# Patient Record
Sex: MALE | Race: OTHER | Employment: OTHER | URBAN - METROPOLITAN AREA
[De-identification: names, ages, dates, MRNs, and addresses within clinical notes are randomized per-mention and may not be internally consistent; named-entity substitution may affect disease eponyms.]

---

## 2019-11-06 ENCOUNTER — HOSPITAL ENCOUNTER (INPATIENT)
Facility: HOSPITAL | Age: 49
LOS: 3 days | Discharge: HOME | DRG: 699 | End: 2019-11-09
Attending: EMERGENCY MEDICINE | Admitting: INTERNAL MEDICINE
Payer: COMMERCIAL

## 2019-11-06 ENCOUNTER — APPOINTMENT (OUTPATIENT)
Dept: ULTRASOUND IMAGING | Facility: HOSPITAL | Age: 49
DRG: 699 | End: 2019-11-06
Attending: EMERGENCY MEDICINE
Payer: COMMERCIAL

## 2019-11-06 ENCOUNTER — APPOINTMENT (OUTPATIENT)
Dept: GENERAL RADIOLOGY | Facility: HOSPITAL | Age: 49
DRG: 699 | End: 2019-11-06
Attending: EMERGENCY MEDICINE
Payer: COMMERCIAL

## 2019-11-06 ENCOUNTER — APPOINTMENT (OUTPATIENT)
Dept: CV DIAGNOSTICS | Facility: HOSPITAL | Age: 49
DRG: 699 | End: 2019-11-06
Attending: INTERNAL MEDICINE
Payer: COMMERCIAL

## 2019-11-06 ENCOUNTER — APPOINTMENT (OUTPATIENT)
Dept: ULTRASOUND IMAGING | Facility: HOSPITAL | Age: 49
DRG: 699 | End: 2019-11-06
Attending: INTERNAL MEDICINE
Payer: COMMERCIAL

## 2019-11-06 DIAGNOSIS — R60.0 LOWER EXTREMITY EDEMA: ICD-10-CM

## 2019-11-06 DIAGNOSIS — R80.9 PROTEINURIA, UNSPECIFIED TYPE: ICD-10-CM

## 2019-11-06 DIAGNOSIS — R06.00 DYSPNEA ON EXERTION: Primary | ICD-10-CM

## 2019-11-06 DIAGNOSIS — J90 PLEURAL EFFUSION: ICD-10-CM

## 2019-11-06 PROBLEM — R73.9 HYPERGLYCEMIA: Status: ACTIVE | Noted: 2019-11-06

## 2019-11-06 PROCEDURE — 93306 TTE W/DOPPLER COMPLETE: CPT | Performed by: INTERNAL MEDICINE

## 2019-11-06 PROCEDURE — 86704 HEP B CORE ANTIBODY TOTAL: CPT | Performed by: INTERNAL MEDICINE

## 2019-11-06 PROCEDURE — 87340 HEPATITIS B SURFACE AG IA: CPT | Performed by: INTERNAL MEDICINE

## 2019-11-06 PROCEDURE — 83883 ASSAY NEPHELOMETRY NOT SPEC: CPT | Performed by: INTERNAL MEDICINE

## 2019-11-06 PROCEDURE — 80048 BASIC METABOLIC PNL TOTAL CA: CPT | Performed by: EMERGENCY MEDICINE

## 2019-11-06 PROCEDURE — 83516 IMMUNOASSAY NONANTIBODY: CPT | Performed by: INTERNAL MEDICINE

## 2019-11-06 PROCEDURE — 82570 ASSAY OF URINE CREATININE: CPT | Performed by: INTERNAL MEDICINE

## 2019-11-06 PROCEDURE — 93010 ELECTROCARDIOGRAM REPORT: CPT | Performed by: EMERGENCY MEDICINE

## 2019-11-06 PROCEDURE — 86038 ANTINUCLEAR ANTIBODIES: CPT | Performed by: INTERNAL MEDICINE

## 2019-11-06 PROCEDURE — 86255 FLUORESCENT ANTIBODY SCREEN: CPT | Performed by: INTERNAL MEDICINE

## 2019-11-06 PROCEDURE — 93005 ELECTROCARDIOGRAM TRACING: CPT

## 2019-11-06 PROCEDURE — 86160 COMPLEMENT ANTIGEN: CPT | Performed by: INTERNAL MEDICINE

## 2019-11-06 PROCEDURE — 84439 ASSAY OF FREE THYROXINE: CPT | Performed by: EMERGENCY MEDICINE

## 2019-11-06 PROCEDURE — 83876 ASSAY MYELOPEROXIDASE: CPT | Performed by: INTERNAL MEDICINE

## 2019-11-06 PROCEDURE — 76770 US EXAM ABDO BACK WALL COMP: CPT | Performed by: INTERNAL MEDICINE

## 2019-11-06 PROCEDURE — 84300 ASSAY OF URINE SODIUM: CPT | Performed by: INTERNAL MEDICINE

## 2019-11-06 PROCEDURE — 96374 THER/PROPH/DIAG INJ IV PUSH: CPT

## 2019-11-06 PROCEDURE — 93970 EXTREMITY STUDY: CPT | Performed by: EMERGENCY MEDICINE

## 2019-11-06 PROCEDURE — 99285 EMERGENCY DEPT VISIT HI MDM: CPT

## 2019-11-06 PROCEDURE — 84443 ASSAY THYROID STIM HORMONE: CPT | Performed by: EMERGENCY MEDICINE

## 2019-11-06 PROCEDURE — 86803 HEPATITIS C AB TEST: CPT | Performed by: INTERNAL MEDICINE

## 2019-11-06 PROCEDURE — 81001 URINALYSIS AUTO W/SCOPE: CPT | Performed by: EMERGENCY MEDICINE

## 2019-11-06 PROCEDURE — 86706 HEP B SURFACE ANTIBODY: CPT | Performed by: INTERNAL MEDICINE

## 2019-11-06 PROCEDURE — 86708 HEPATITIS A ANTIBODY: CPT | Performed by: INTERNAL MEDICINE

## 2019-11-06 PROCEDURE — 84156 ASSAY OF PROTEIN URINE: CPT | Performed by: INTERNAL MEDICINE

## 2019-11-06 PROCEDURE — 71045 X-RAY EXAM CHEST 1 VIEW: CPT | Performed by: EMERGENCY MEDICINE

## 2019-11-06 PROCEDURE — 85025 COMPLETE CBC W/AUTO DIFF WBC: CPT | Performed by: EMERGENCY MEDICINE

## 2019-11-06 PROCEDURE — 83880 ASSAY OF NATRIURETIC PEPTIDE: CPT | Performed by: EMERGENCY MEDICINE

## 2019-11-06 PROCEDURE — 80076 HEPATIC FUNCTION PANEL: CPT | Performed by: EMERGENCY MEDICINE

## 2019-11-06 PROCEDURE — 84165 PROTEIN E-PHORESIS SERUM: CPT | Performed by: INTERNAL MEDICINE

## 2019-11-06 PROCEDURE — 85610 PROTHROMBIN TIME: CPT | Performed by: CLINICAL NURSE SPECIALIST

## 2019-11-06 PROCEDURE — 80500 HEPATITIS A B + C PROFILE: CPT | Performed by: INTERNAL MEDICINE

## 2019-11-06 RX ORDER — SODIUM CHLORIDE 0.9 % (FLUSH) 0.9 %
3 SYRINGE (ML) INJECTION AS NEEDED
Status: DISCONTINUED | OUTPATIENT
Start: 2019-11-06 | End: 2019-11-09

## 2019-11-06 RX ORDER — ONDANSETRON 2 MG/ML
4 INJECTION INTRAMUSCULAR; INTRAVENOUS EVERY 6 HOURS PRN
Status: DISCONTINUED | OUTPATIENT
Start: 2019-11-06 | End: 2019-11-09

## 2019-11-06 RX ORDER — FUROSEMIDE 10 MG/ML
20 INJECTION INTRAMUSCULAR; INTRAVENOUS ONCE
Status: COMPLETED | OUTPATIENT
Start: 2019-11-06 | End: 2019-11-06

## 2019-11-06 RX ORDER — BISACODYL 10 MG
10 SUPPOSITORY, RECTAL RECTAL
Status: DISCONTINUED | OUTPATIENT
Start: 2019-11-06 | End: 2019-11-09

## 2019-11-06 RX ORDER — SODIUM PHOSPHATE, DIBASIC AND SODIUM PHOSPHATE, MONOBASIC 7; 19 G/133ML; G/133ML
1 ENEMA RECTAL ONCE AS NEEDED
Status: DISCONTINUED | OUTPATIENT
Start: 2019-11-06 | End: 2019-11-09

## 2019-11-06 RX ORDER — ACETAMINOPHEN 325 MG/1
650 TABLET ORAL EVERY 6 HOURS PRN
Status: DISCONTINUED | OUTPATIENT
Start: 2019-11-06 | End: 2019-11-09

## 2019-11-06 RX ORDER — MULTIVIT-MIN/IRON FUM/FOLIC AC 7.5 MG-4
1 TABLET ORAL DAILY
COMMUNITY

## 2019-11-06 RX ORDER — HEPARIN SODIUM 5000 [USP'U]/ML
5000 INJECTION, SOLUTION INTRAVENOUS; SUBCUTANEOUS EVERY 8 HOURS SCHEDULED
Status: DISPENSED | OUTPATIENT
Start: 2019-11-06 | End: 2019-11-06

## 2019-11-06 RX ORDER — TEMAZEPAM 7.5 MG/1
7.5 CAPSULE ORAL NIGHTLY PRN
Status: DISCONTINUED | OUTPATIENT
Start: 2019-11-06 | End: 2019-11-09

## 2019-11-06 RX ORDER — POLYETHYLENE GLYCOL 3350 17 G/17G
17 POWDER, FOR SOLUTION ORAL DAILY PRN
Status: DISCONTINUED | OUTPATIENT
Start: 2019-11-06 | End: 2019-11-09

## 2019-11-06 RX ORDER — ERGOCALCIFEROL 1.25 MG/1
50000 CAPSULE ORAL
Status: DISCONTINUED | OUTPATIENT
Start: 2019-11-06 | End: 2019-11-09

## 2019-11-06 RX ORDER — METOCLOPRAMIDE HYDROCHLORIDE 5 MG/ML
10 INJECTION INTRAMUSCULAR; INTRAVENOUS EVERY 8 HOURS PRN
Status: DISCONTINUED | OUTPATIENT
Start: 2019-11-06 | End: 2019-11-09

## 2019-11-06 RX ORDER — ROSUVASTATIN CALCIUM 10 MG/1
10 TABLET, COATED ORAL NIGHTLY
Status: DISCONTINUED | OUTPATIENT
Start: 2019-11-06 | End: 2019-11-09

## 2019-11-06 NOTE — CONSULTS
[facility]    Report of Consultation    Myra Malave Patient Status:  Inpatient    3/19/1970 MRN [id number]   Location [address] [facility] Attending Perfecto Trejo, [address] [facility] Day # 0 PCP Philomena Gilbert [id number]     Date of Admission:   PRN  PEG 3350 (MIRALAX) powder packet 17 g, 17 g, Oral, Daily PRN  bisacodyl (DULCOLAX) rectal suppository 10 mg, 10 mg, Rectal, Daily PRN  FLEET ENEMA (FLEET) 7-19 GM/118ML enema 133 mL, 1 enema, Rectal, Once PRN      No medications prior to admission. basilar pleural reaction.   Follow-up to resolution    Dictated by (CST): Bety Blevins MD on 11/06/2019 at 10:01     Approved by (CST): Bety Blevins MD on 11/06/2019 at 10:04               Impression:     51year old with SOB, LE and Proteinur

## 2019-11-06 NOTE — ED PROVIDER NOTES
Patient Seen in: [facility] AND [facility] Emergency Department      History   Patient presents with:  Abnormal Result (metabolic, cardiac)    Stated Complaint: abnormal labs    HPI    Patient presents the emergency department today after being directed here by Constitutional:       General: He is not in acute distress. Appearance: He is well-developed. HENT:      Head: Normocephalic.    Eyes:      Conjunctiva/sclera: Conjunctivae normal.   Neck:      Musculoskeletal: Normal range of motion and neck supple W/ DIFFERENTIAL - Abnormal; Notable for the following components:    RDW-SD 47.4 (*)     All other components within normal limits   CBC WITH DIFFERENTIAL WITH PLATELET    Narrative:      The following orders were created for panel order CBC WITH DIFFERENTI diagnosis)  Pleural effusion  Lower extremity edema  Proteinuria, unspecified type    Disposition:  Admit  11/6/2019 12:29 pm    Follow-up:  No follow-up provider specified. Medications Prescribed:  There are no discharge medications for this patient.

## 2019-11-06 NOTE — ED INITIAL ASSESSMENT (HPI)
Patient sent here with abnormal lab results from PCP. States he had abnormal kidney function and abnormal urinalysis. C/o lower back pain and swelling to bilateral lower extremities x 4 months.

## 2019-11-06 NOTE — PROGRESS NOTES
ASSESSMENT/PLAN:     Impression: 1. Shortness of breath with pleural effusion and edema of uncertain etiology. 2.  Hypertension. 3.  Hypothyroidism with elevated TSH. 4.  Pleural effusion.   5.  Proteinuria    Recommendation: Agree with nephrology ev allergies. ROS otherwise negative except as in HPI. EXAM:   BP (!) 150/96   Pulse 75   Temp 98.4 °F (36.9 °C) (Oral)   Resp 18   Ht 6' 1\" (1.854 m)   Wt 207 lb (93.9 kg)   SpO2 96%   BMI 27.31 kg/m²   CONS: well developed, well nourished.  Gucci Florez

## 2019-11-06 NOTE — H&P
[place] [facility] - [facility]    History & Physical    Rey Roman Patient Status:  Inpatient    3/19/1970 MRN [id number]   Location [address] [facility] Attending Ladarius Amaro, DO   Hosp Day # 0 PCP Moe Mustafa MD, Cristiano Marion     Date:  2019  Date of Adm lymphadenopathy. No JVD. No carotid bruits. Respiratory: Clear to auscultation bilaterally. No wheezes. No rhonchi. Cardiovascular: S1, S2.  Regular rate and rhythm. No murmurs. Equal pulses   Abdomen: Soft, nontender, nondistended.   Positive bowel so reactive   - - - hiv neg  - - - sed rate 123  - - - crp 4, RF neg, ELLEN screen negative   - - - free T3 - 99 - wnl  - - - TSH - 7  - - - Free T4 - 0.9 - low limit of normal     - - - vit d 5    Assessment/Plan:     Rey Owens is a(n) 51year old male who p

## 2019-11-07 ENCOUNTER — APPOINTMENT (OUTPATIENT)
Dept: INTERVENTIONAL RADIOLOGY/VASCULAR | Facility: HOSPITAL | Age: 49
DRG: 699 | End: 2019-11-07
Attending: INTERNAL MEDICINE
Payer: COMMERCIAL

## 2019-11-07 PROCEDURE — 50200 RENAL BIOPSY PERQ: CPT

## 2019-11-07 PROCEDURE — 88346 IMFLUOR 1ST 1ANTB STAIN PX: CPT | Performed by: PATHOLOGY

## 2019-11-07 PROCEDURE — 83735 ASSAY OF MAGNESIUM: CPT | Performed by: INTERNAL MEDICINE

## 2019-11-07 PROCEDURE — 88313 SPECIAL STAINS GROUP 2: CPT | Performed by: PATHOLOGY

## 2019-11-07 PROCEDURE — 76942 ECHO GUIDE FOR BIOPSY: CPT

## 2019-11-07 PROCEDURE — 84156 ASSAY OF PROTEIN URINE: CPT | Performed by: INTERNAL MEDICINE

## 2019-11-07 PROCEDURE — 88350 IMFLUOR EA ADDL 1ANTB STN PX: CPT | Performed by: PATHOLOGY

## 2019-11-07 PROCEDURE — 85025 COMPLETE CBC W/AUTO DIFF WBC: CPT | Performed by: INTERNAL MEDICINE

## 2019-11-07 PROCEDURE — 88305 TISSUE EXAM BY PATHOLOGIST: CPT | Performed by: PATHOLOGY

## 2019-11-07 PROCEDURE — 99152 MOD SED SAME PHYS/QHP 5/>YRS: CPT

## 2019-11-07 PROCEDURE — 88300 SURGICAL PATH GROSS: CPT | Performed by: INTERNAL MEDICINE

## 2019-11-07 PROCEDURE — 80069 RENAL FUNCTION PANEL: CPT | Performed by: INTERNAL MEDICINE

## 2019-11-07 PROCEDURE — 0TB13ZX EXCISION OF LEFT KIDNEY, PERCUTANEOUS APPROACH, DIAGNOSTIC: ICD-10-PCS | Performed by: RADIOLOGY

## 2019-11-07 PROCEDURE — 88348 ELECTRON MICROSCOPY DX: CPT | Performed by: PATHOLOGY

## 2019-11-07 RX ORDER — MIDAZOLAM HYDROCHLORIDE 1 MG/ML
INJECTION INTRAMUSCULAR; INTRAVENOUS
Status: COMPLETED
Start: 2019-11-07 | End: 2019-11-07

## 2019-11-07 RX ORDER — FUROSEMIDE 10 MG/ML
20 INJECTION INTRAMUSCULAR; INTRAVENOUS DAILY
Status: DISCONTINUED | OUTPATIENT
Start: 2019-11-07 | End: 2019-11-08

## 2019-11-07 NOTE — PROGRESS NOTES
[facility] Hospitalist Progress Note   CC: follow-up hospital admission    ASSESSMENT/PLAN:  Gurinder Seek is a(n) 51year old male who presents w/ about 8 weeks worth of progressive dyspnea on exertion, and lower extremity edema.      Proteinuria   Hypoalbuminemia No wheezes. No rhonchi. Cardiovascular: S1, S2.  Regular rate and rhythm. No murmurs. Equal pulses   Abdomen: Soft, nontender, nondistended. Positive bowel sounds. No rebound tenderness  Neurologic: No focal neurological deficits.   Musculoskeletal: Full

## 2019-11-07 NOTE — PLAN OF CARE
Problem: SAFETY ADULT - FALL  Goal: Free from fall injury  Description  INTERVENTIONS:  - Assess pt frequently for physical needs  - Identify cognitive and physical deficits and behaviors that affect risk of falls.   - [place] fall precautions as indica

## 2019-11-07 NOTE — PROGRESS NOTES
Procedure hand off report given to DEREK Michel. Procedural access site is dry and intact with no signs and symptoms of bleeding. Pt is generally stable.

## 2019-11-07 NOTE — PROGRESS NOTES
ASSESSMENT/PLAN:     Impression: 1. Shortness of breath with pleural effusion and edema due to nephrotic syndrome  2. Hypertension. 3.  Hypothyroidism with elevated TSH. 4.  Pleural effusion.   5.  Proteinuria     Recommendation:  Echo and cardiac wor trauma, normocephalic. EYES:conjunctiva not injected, no xanthelasma. ENT:mucosa pink and moist. NECK:jugular venous pressure not elevated. RESP:normal rate and rhythm,diminished base. GI:soft, non-tender;rectal deferred.  MS:adequate gait for exercise test

## 2019-11-07 NOTE — PROGRESS NOTES
[place] [facility] - [facility]    Progress Note    Zaida Mccormack Patient Status:  Inpatient    3/19/1970 MRN [id number]   Location [facility] 3W/SW Attending Werner Cortes DO   Hosp Day # 1 PCP Benedicto Cook MD, Anya Barclay       Subjective:     Seen and exam changes. 3. Minimal left basilar pleural reaction.   Follow-up to resolution    Dictated by (CST): Min Howell MD on 11/06/2019 at 10:01     Approved by (CST): Min Howell MD on 11/06/2019 at 10:04          Ekg 12-lead    Result Date: 11/6/

## 2019-11-07 NOTE — BRIEF PROCEDURE NOTE
[facility]  Procedure Note    Fazal Single Patient Status:  Inpatient    3/19/1970 MRN [id number]   Location [facility] 3W/SW Attending Anyi Alvarez, [address] [facility] Day # 1 PCP Dayanara Duran MD, Katlyn Pittman     Procedure: Ultrasound-guided

## 2019-11-07 NOTE — PLAN OF CARE
Problem: Patient Centered Care  Goal: Patient preferences are identified and integrated in the patient's plan of care  Description  Interventions:  - What would you like us to know as we care for you?  \"tell me what's wrong\"  - Provide timely, complete, Assess barriers to adequate nutritional intake and initiate nutrition consult as needed  - Instruct patient on self management of diabetes  Outcome: Progressing     Problem: SKIN/TISSUE INTEGRITY - ADULT  Goal: Skin integrity remains intact  Description  I

## 2019-11-07 NOTE — IVS NOTE
Inpatient Throughput Communication:    Called inpatient RN Hannah Gonzales and notified of scheduled procedure Renal Bx on 11/7 0800. Verified that appropriate consent is signed: Yes  Appropriate Consent Signed:  Yes  Access Site Hair Clipped and skin prepped: No

## 2019-11-07 NOTE — DIETARY NOTE
NUTRITION EDUCATION NOTE    Received consult for nutrition education. Appropriate education and handout provided. See education section of Epic for specifics.     Yovana Zuniga RD,LDN  [phone]

## 2019-11-07 NOTE — PAYOR COMM NOTE
--------------  ADMISSION REVIEW     Payor: [employer]  Subscriber #:  [id number]  Authorization Number: N/A    ED Provider Notes             Patient Seen in: [facility] Emergency Department      History   Patient presents with:  Abnormal Result (met acute distress. Appearance: He is well-developed. HENT:      Head: Normocephalic. Eyes:      Conjunctiva/sclera: Conjunctivae normal.   Neck:      Musculoskeletal: Normal range of motion and neck supple.    Cardiovascular:      Rate and Rhythm: Norm following components:    RDW-SD 47.4 (*)     All other components within normal limits   CBC WITH DIFFERENTIAL WITH PLATELET    Narrative: The following orders were created for panel order CBC WITH DIFFERENTIAL WITH PLATELET.   Procedure progressive dyspnea on exertion, and lower extremity edema.  This has been slowly progressive, started at least 2 months ago, has seen his pcp for this and he was found to have proteinuria, hypoalbuminemia and instructed to follow up w/ renal, he was not ab 350  - - - trg 341  - - - hep a IGM, hep B sAG, hep b core, hep C - non reactive   - - - hiv neg  - - - sed rate 123  - - - crp 4, RF neg, ELLEN screen negative   - - - free T3 - 99 - wnl  - - - TSH - 7  - - - Free T4 - 0.9 - low limit of normal     - - - vi for strict ins/outs  - Lasix 20mg IV QD uptitrate pending diuresis.     Nephrotic Syndrome?:  - Agree with LE dopplers  - Pending biopsy results might need anticoagulation.     Elevated BP:  - Likely volume mediated.   - Can Give Hydralazine 25mg PO TID as

## 2019-11-07 NOTE — PLAN OF CARE
Problem: SAFETY ADULT - FALL  Goal: Free from fall injury  Description  INTERVENTIONS:  - Assess pt frequently for physical needs  - Identify cognitive and physical deficits and behaviors that affect risk of falls.   - [place] fall precautions as indica

## 2019-11-08 PROCEDURE — 80069 RENAL FUNCTION PANEL: CPT | Performed by: INTERNAL MEDICINE

## 2019-11-08 PROCEDURE — 83735 ASSAY OF MAGNESIUM: CPT | Performed by: INTERNAL MEDICINE

## 2019-11-08 PROCEDURE — 87389 HIV-1 AG W/HIV-1&-2 AB AG IA: CPT | Performed by: INTERNAL MEDICINE

## 2019-11-08 PROCEDURE — P9047 ALBUMIN (HUMAN), 25%, 50ML: HCPCS | Performed by: INTERNAL MEDICINE

## 2019-11-08 PROCEDURE — 85025 COMPLETE CBC W/AUTO DIFF WBC: CPT | Performed by: INTERNAL MEDICINE

## 2019-11-08 RX ORDER — FUROSEMIDE 20 MG/1
20 TABLET ORAL
Status: DISCONTINUED | OUTPATIENT
Start: 2019-11-09 | End: 2019-11-08

## 2019-11-08 RX ORDER — SULFAMETHOXAZOLE AND TRIMETHOPRIM 800; 160 MG/1; MG/1
1 TABLET ORAL
Status: DISCONTINUED | OUTPATIENT
Start: 2019-11-08 | End: 2019-11-09

## 2019-11-08 RX ORDER — HEPARIN SODIUM 5000 [USP'U]/ML
5000 INJECTION, SOLUTION INTRAVENOUS; SUBCUTANEOUS EVERY 8 HOURS SCHEDULED
Status: DISCONTINUED | OUTPATIENT
Start: 2019-11-08 | End: 2019-11-09

## 2019-11-08 RX ORDER — FUROSEMIDE 20 MG/1
20 TABLET ORAL DAILY
Status: DISCONTINUED | OUTPATIENT
Start: 2019-11-08 | End: 2019-11-09

## 2019-11-08 RX ORDER — ALBUMIN (HUMAN) 12.5 G/50ML
25 SOLUTION INTRAVENOUS EVERY 6 HOURS
Status: COMPLETED | OUTPATIENT
Start: 2019-11-08 | End: 2019-11-09

## 2019-11-08 RX ORDER — LISINOPRIL 20 MG/1
20 TABLET ORAL DAILY
Status: DISCONTINUED | OUTPATIENT
Start: 2019-11-08 | End: 2019-11-09

## 2019-11-08 RX ORDER — PANTOPRAZOLE SODIUM 40 MG/1
40 TABLET, DELAYED RELEASE ORAL
Status: DISCONTINUED | OUTPATIENT
Start: 2019-11-09 | End: 2019-11-09

## 2019-11-08 RX ORDER — PREDNISONE 20 MG/1
60 TABLET ORAL
Status: DISCONTINUED | OUTPATIENT
Start: 2019-11-08 | End: 2019-11-09

## 2019-11-08 NOTE — PLAN OF CARE
Problem: Patient Centered Care  Goal: Patient preferences are identified and integrated in the patient's plan of care  Description  Interventions:  - What would you like us to know as we care for you?  \"tell me what's wrong\"  - Provide timely, complete, antiarrhythmic and heart rate control medications as ordered  - Initiate emergency measures for life threatening arrhythmias  - Monitor electrolytes and administer replacement therapy as ordered  Outcome: Progressing     Problem: METABOLIC/FLUID AND ELECTR Monitor for areas of redness and/or skin breakdown  - Initiate interventions, skin care algorithm/standards of care as needed  Outcome: Progressing  Goal: Incision(s), wounds(s) or drain site(s) healing without S/S of infection  Description  INTERVENTIONS:

## 2019-11-08 NOTE — PROGRESS NOTES
[place] [facility] - [facility]    Progress Note    Pj Ospina Patient Status:  Inpatient    3/19/1970 MRN [id number]   Location [facility] 3W/SW Attending Macarena Mead, [address] [facility] Day # 2 PCP Linda Mora MD, Jaswant Anton       Subjective:     Mr. Jovan davis 11/08/2019     (H) 11/08/2019    CA 8.0 (L) 11/08/2019    ALB 0.9 (L) 11/08/2019    ALKPHO 73 11/06/2019    BILT 0.3 11/06/2019    TP 5.0 (L) 11/06/2019    AST 28 11/06/2019    ALT 20 11/06/2019    INR 0.98 11/06/2019    T4F 0.6 (L) 11/06/2019    TS Continue Crestor  - Given that his albumin is so low, it is reasonable to give a short course of IV albumin.  However evidence for or against this is controversial.   - Low sodium diet  - Given that albumin is < 2.0 and Mr. Che Plascencia is at low risk of bleeding albumin is > 2.0. Warfarin is generally the recommended agent. DOAC's, such as apixaban, have been used, however have not been specifically studied in patients with Nephrotic syndrome.  Would restart heparin tonight and recheck hgb in the morning to ensure

## 2019-11-08 NOTE — PROGRESS NOTES
[facility] Hospitalist Progress Note   CC: follow-up hospital admission    ASSESSMENT/PLAN:  Rafael Paul is a(n) 51year old male who presents w/ about 8 weeks worth of progressive dyspnea on exertion, and lower extremity edema.      Proteinuria   Hypoalbuminemia No rhonchi. Cardiovascular: S1, S2.  Regular rate and rhythm. No murmurs. Equal pulses   Abdomen: Soft, nontender, nondistended. Positive bowel sounds. No rebound tenderness  Neurologic: No focal neurological deficits.   Musculoskeletal: Full range of mo

## 2019-11-08 NOTE — PROGRESS NOTES
Patient appeared comfortable and appreciative of  visit. Patient is anxious about his job and going back to work asap.  He declined prayer offer      11/08/19 4506   Clinical Encounter Type   Visited With Patient   Routine Visit Introduction   Moris

## 2019-11-09 VITALS
TEMPERATURE: 98 F | OXYGEN SATURATION: 98 % | RESPIRATION RATE: 18 BRPM | HEIGHT: 73 IN | SYSTOLIC BLOOD PRESSURE: 141 MMHG | HEART RATE: 95 BPM | BODY MASS INDEX: 32.58 KG/M2 | DIASTOLIC BLOOD PRESSURE: 78 MMHG | WEIGHT: 245.81 LBS

## 2019-11-09 PROCEDURE — 85027 COMPLETE CBC AUTOMATED: CPT | Performed by: HOSPITALIST

## 2019-11-09 PROCEDURE — 83735 ASSAY OF MAGNESIUM: CPT | Performed by: INTERNAL MEDICINE

## 2019-11-09 PROCEDURE — 82040 ASSAY OF SERUM ALBUMIN: CPT | Performed by: INTERNAL MEDICINE

## 2019-11-09 PROCEDURE — 80048 BASIC METABOLIC PNL TOTAL CA: CPT | Performed by: INTERNAL MEDICINE

## 2019-11-09 PROCEDURE — 85018 HEMOGLOBIN: CPT | Performed by: INTERNAL MEDICINE

## 2019-11-09 RX ORDER — ROSUVASTATIN CALCIUM 10 MG/1
10 TABLET, COATED ORAL NIGHTLY
Qty: 30 TABLET | Refills: 0 | Status: SHIPPED | OUTPATIENT
Start: 2019-11-09

## 2019-11-09 RX ORDER — LEVOTHYROXINE SODIUM 0.15 MG/1
150 TABLET ORAL
Status: DISCONTINUED | OUTPATIENT
Start: 2019-11-09 | End: 2019-11-09

## 2019-11-09 RX ORDER — LEVOTHYROXINE SODIUM 0.15 MG/1
150 TABLET ORAL
Qty: 30 TABLET | Refills: 0 | Status: SHIPPED | OUTPATIENT
Start: 2019-11-09 | End: 2019-11-09

## 2019-11-09 RX ORDER — PREDNISONE 20 MG/1
60 TABLET ORAL
Qty: 30 TABLET | Refills: 0 | Status: SHIPPED | OUTPATIENT
Start: 2019-11-10 | End: 2019-12-02

## 2019-11-09 RX ORDER — ERGOCALCIFEROL 1.25 MG/1
50000 CAPSULE ORAL
Qty: 11 CAPSULE | Refills: 0 | Status: SHIPPED | OUTPATIENT
Start: 2019-11-13

## 2019-11-09 RX ORDER — LEVOTHYROXINE SODIUM 0.15 MG/1
150 TABLET ORAL
Qty: 30 TABLET | Refills: 0 | Status: SHIPPED | OUTPATIENT
Start: 2019-11-09

## 2019-11-09 RX ORDER — LISINOPRIL 20 MG/1
20 TABLET ORAL DAILY
Qty: 30 TABLET | Refills: 0 | Status: SHIPPED | OUTPATIENT
Start: 2019-11-10

## 2019-11-09 RX ORDER — FUROSEMIDE 20 MG/1
20 TABLET ORAL DAILY
Qty: 30 TABLET | Refills: 0 | Status: SHIPPED | OUTPATIENT
Start: 2019-11-10

## 2019-11-09 RX ORDER — POLYETHYLENE GLYCOL 3350 17 G/17G
17 POWDER, FOR SOLUTION ORAL DAILY PRN
Qty: 30 EACH | Refills: 0 | Status: SHIPPED | OUTPATIENT
Start: 2019-11-09

## 2019-11-09 RX ORDER — SULFAMETHOXAZOLE AND TRIMETHOPRIM 800; 160 MG/1; MG/1
1 TABLET ORAL
Qty: 15 TABLET | Refills: 0 | Status: SHIPPED | OUTPATIENT
Start: 2019-11-11

## 2019-11-09 RX ORDER — PANTOPRAZOLE SODIUM 40 MG/1
40 TABLET, DELAYED RELEASE ORAL
Qty: 30 TABLET | Refills: 0 | Status: SHIPPED | OUTPATIENT
Start: 2019-11-10

## 2019-11-09 NOTE — PLAN OF CARE
Jairon is feeling \"well\" this am. He is hopeful of being discharged today. Per Dr. Christianne Ordoñez, he may go home if nephrology signs off.     Problem: Patient Centered Care  Goal: Patient preferences are identified and integrated in the patient's plan of care  Descr baseline  Description  INTERVENTIONS:  - Continuous cardiac monitoring, monitor vital signs, obtain 12 lead EKG if indicated  - Evaluate effectiveness of antiarrhythmic and heart rate control medications as ordered  - Initiate emergency measures for life t remains intact  Description  INTERVENTIONS  - Assess and document risk factors for pressure ulcer development  - Assess and document skin integrity  - Monitor for areas of redness and/or skin breakdown  - Initiate interventions, skin care algorithm/standar

## 2019-11-09 NOTE — DISCHARGE SUMMARY
[facility] Internal Medicine Discharge Summary   Patient ID:  Enrico Houser  [id number]  51year old  3/19/1970    Admit date: 11/6/2019    Discharge date and time: 11/9/2019     Attending Physician: Vidal Warner MD     Primary Care Physician: Radha Rm MD, Prabhu Mann medications    apixaban 5 MG Tabs  Commonly known as:  ELIQUIS  Take 1 tablet (5 mg total) by mouth 2 (two) times daily.      ergocalciferol 1.25 MG (43123 UT) Caps  Commonly known as:  DRISDOL/VITAMIN D2  Take 1 capsule (50,000 Units total) by mouth every Caps  · furosemide 20 MG Tabs  · lisinopril 20 MG Tabs  · Pantoprazole Sodium 40 MG Tbec  · PEG 3350 Pack  · predniSONE 20 MG Tabs  · Rosuvastatin Calcium 10 MG Tabs  · Sulfamethoxazole-TMP -160 MG Tabs per tablet         Consults: IP CONSULT TO HOSP calculus. The bladder is well distended measuring 600 mL, likely physiologic. The left ureteral jet is visible. OTHER: A right pleural effusion is noted, as demonstrated on the recent chest x-ray. CONCLUSION:   1.  Unremarkable sonographic appearance of the nature of the procedure which included a discussion of the benefits and risks including, but not limited to, bleeding, infection. After obtaining informed consent, an ultrasound-guided biopsy was performed in  the usual sterile manner.   Following info

## 2019-11-09 NOTE — PLAN OF CARE
Patient is alert and oriented x4. RA. Self care. Plan is to go home tomorrow once medically cleared.      Problem: Patient Centered Care  Goal: Patient preferences are identified and integrated in the patient's plan of care  Description  Interventions:  - W cardiac monitoring, monitor vital signs, obtain 12 lead EKG if indicated  - Evaluate effectiveness of antiarrhythmic and heart rate control medications as ordered  - Initiate emergency measures for life threatening arrhythmias  - Monitor electrolytes and a Assess and document risk factors for pressure ulcer development  - Assess and document skin integrity  - Monitor for areas of redness and/or skin breakdown  - Initiate interventions, skin care algorithm/standards of care as needed  Outcome: Progressing  Go

## 2019-11-09 NOTE — PLAN OF CARE
Problem: Patient Centered Care  Goal: Patient preferences are identified and integrated in the patient's plan of care  Description  Interventions:  - What would you like us to know as we care for you?  \"tell me what's wrong\"  - Provide timely, complete, Assess for signs of decreased coronary artery perfusion - ex.  Angina  - Evaluate fluid balance, assess for edema, trend weights  11/9/2019 1405 by Adrien Gonzales RN  Outcome: Adequate for Discharge  11/9/2019 1121 by Adrien Gonzales RN  Outcome: Pro Progressing  Goal: Hemodynamic stability and optimal renal function maintained  Description  INTERVENTIONS:  - Monitor labs and assess for signs and symptoms of volume excess or deficit  - Monitor intake, output and patient weight  - Monitor urine specific oral medicated treatments as ordered  11/9/2019 1405 by Tish Newman RN  Outcome: Adequate for Discharge  11/9/2019 1121 by Tish Newman RN  Outcome: Progressing     Problem: SAFETY ADULT - FALL  Goal: Free from fall injury  Description  INTERV

## 2019-11-09 NOTE — PLAN OF CARE
Patient is alert and oriented. Room air. Self care. Plan for discharge home today.      Problem: Patient Centered Care  Goal: Patient preferences are identified and integrated in the patient's plan of care  Description  Interventions:  - What would you like monitor vital signs, obtain 12 lead EKG if indicated  - Evaluate effectiveness of antiarrhythmic and heart rate control medications as ordered  - Initiate emergency measures for life threatening arrhythmias  - Monitor electrolytes and administer replacemen factors for pressure ulcer development  - Assess and document skin integrity  - Monitor for areas of redness and/or skin breakdown  - Initiate interventions, skin care algorithm/standards of care as needed  Outcome: Progressing  Goal: Incision(s), wounds(s

## (undated) NOTE — LETTER
[facility], [place]  Authorization for Invasive Procedures  1.  I hereby authorize Dr. Giuseppe Rodriguez , my physician and whomever may be designated as the doctor's assistant, to perform the following operation and/or procedure:  Ultrasound performed for the purposes of advancing medicine, science, and/or education, provided my identity is not revealed. If the procedure has been videotaped, the physician/surgeon will obtain the original videotape.  The hospital will not be responsible for stor My signature below affirms that prior to the time of the procedure, I have explained to the patient and/or his legal representative, the risks and benefits involved in the proposed treatment and any reasonable alternative to the proposed treatment.  I have